# Patient Record
Sex: MALE | Race: WHITE | Employment: UNEMPLOYED | URBAN - METROPOLITAN AREA
[De-identification: names, ages, dates, MRNs, and addresses within clinical notes are randomized per-mention and may not be internally consistent; named-entity substitution may affect disease eponyms.]

---
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
## 2020-06-03 ENCOUNTER — HOSPITAL ENCOUNTER (OUTPATIENT)
Dept: NEUROLOGY | Age: 20
Discharge: HOME | End: 2020-06-03
Attending: PSYCHIATRY & NEUROLOGY
Payer: COMMERCIAL

## 2020-06-03 DIAGNOSIS — G40.919 EPILEPSY, UNSPECIFIED, INTRACTABLE, WITHOUT STATUS EPILEPTICUS (HCC): ICD-10-CM

## 2020-06-03 PROCEDURE — 95819 EEG AWAKE AND ASLEEP: CPT

## 2020-06-03 NOTE — PROCEDURES
[address]   EEG    Name:  Dylon Lopez  MR#:  [id number]  :  2000  ACCOUNT #:  [de-identified]  DATE OF SERVICE:  2020      This is an outpatient recording. When the patient is awake, the basic occipital resting frequency consists of 9-10 Hz 25-50 microvolt alpha rhythm. Alpha rhythm has broad and symmetrical distribution throughout the EEG and is best seen in the posterior derivations bilaterally. When the patient is drowsy and in light sleep, there is dropout of alpha rhythm with generalized lower amplitude and increased symmetrical slower activity. Hyperventilation was performed and produced no abnormalities. Photic stimulation was performed and produced no abnormalities. Occasionally, spontaneous generalized sharp wave discharges were seen without clinical accompaniment. INTERPRETATION:  EEG is noteworthy for occasional generalized sharp wave discharges that are paroxysmal in nature. There is no clinical accompaniment.         Jean Claude Somers MD      [id number]  D:  2020 11:21  T:  2020 11:58  JOB #:  [id number]

## 2023-03-20 ENCOUNTER — TRANSCRIBE ORDER (OUTPATIENT)
Dept: SCHEDULING | Age: 23
End: 2023-03-20

## 2023-03-20 DIAGNOSIS — G40.309 IDIOPATHIC GENERALIZED EPILEPSY (HCC): Primary | ICD-10-CM

## 2023-07-06 ENCOUNTER — HOSPITAL ENCOUNTER (OUTPATIENT)
Facility: HOSPITAL | Age: 23
Discharge: HOME | End: 2023-07-06
Payer: COMMERCIAL

## 2023-07-06 DIAGNOSIS — G40.309 NONINTRACTABLE GENERALIZED IDIOPATHIC EPILEPSY WITHOUT STATUS EPILEPTICUS (HCC): ICD-10-CM

## 2023-07-06 PROCEDURE — 95816 EEG AWAKE AND DROWSY: CPT

## 2023-07-06 NOTE — PROCEDURES
[address] [facility]  EEG    Name:  Florentino Fermin  MR#:  [id number]  :  2000  ACCOUNT #:  [de-identified]  DATE OF SERVICE:  2023    This is an outpatient EEG. The basic occipital resting frequency consists of 9-11 Hz, 25-50 microvolt alpha rhythm. Alpha rhythm is symmetrically identified posteriorly bilaterally and shows appropriate attenuation with eye opening. In the more anterior derivations, lower amplitude 14-26 Hz beta activity is seen, at times mixed with slower rhythms. When the patient is drowsy, there is dropout of the dominant posterior rhythm with diminished generalized amplitude and increased slowing. Fast activity seen symmetrically is more noticeable during drowsiness. Hyperventilation was performed and produced moderate symmetrical buildup and slowing. Photic stimulation was performed and produced no abnormalities. Photic driving was identified. This EEG is nonfocal, nonlateralizing, and nonparoxysmal.    INTERPRETATION:  Normal awake and drowsy EEG for age.       Amy Fuentes MD      [id number]  D:  2023 9:35  T:  2023 11:53  JOB #:  [id number]